# Patient Record
Sex: FEMALE | Race: WHITE | URBAN - METROPOLITAN AREA
[De-identification: names, ages, dates, MRNs, and addresses within clinical notes are randomized per-mention and may not be internally consistent; named-entity substitution may affect disease eponyms.]

---

## 2024-01-05 ENCOUNTER — TELEPHONE (OUTPATIENT)
Age: 54
End: 2024-01-05

## 2024-01-05 NOTE — TELEPHONE ENCOUNTER
Left  [phone] for pt to reschedule appt she has 2/22/24 due to Dr. Bailon out of office that day.-TM 1/5/24